# Patient Record
Sex: FEMALE | Race: BLACK OR AFRICAN AMERICAN | NOT HISPANIC OR LATINO | Employment: FULL TIME | URBAN - METROPOLITAN AREA
[De-identification: names, ages, dates, MRNs, and addresses within clinical notes are randomized per-mention and may not be internally consistent; named-entity substitution may affect disease eponyms.]

---

## 2023-06-27 ENCOUNTER — APPOINTMENT (OUTPATIENT)
Dept: PRIMARY CARE | Facility: CLINIC | Age: 38
End: 2023-06-27
Payer: COMMERCIAL

## 2023-07-12 PROBLEM — I10 HYPERTENSION: Status: ACTIVE | Noted: 2023-07-12

## 2023-07-12 PROBLEM — A59.9 TRICHOMONIASIS: Status: RESOLVED | Noted: 2023-07-12 | Resolved: 2023-07-12

## 2025-03-11 ENCOUNTER — OFFICE VISIT (OUTPATIENT)
Dept: URGENT CARE | Age: 40
End: 2025-03-11
Payer: COMMERCIAL

## 2025-03-11 DIAGNOSIS — Z76.89 ENCOUNTER TO ESTABLISH CARE: ICD-10-CM

## 2025-03-11 DIAGNOSIS — Z76.0 ISSUE OF REPEAT PRESCRIPTION FOR MEDICATION: ICD-10-CM

## 2025-03-11 DIAGNOSIS — I10 PRIMARY HYPERTENSION: Primary | ICD-10-CM

## 2025-03-11 RX ORDER — NIFEDIPINE 90 MG/1
90 TABLET, EXTENDED RELEASE ORAL DAILY
Qty: 30 TABLET | Refills: 2 | Status: SHIPPED | OUTPATIENT
Start: 2025-03-11

## 2025-03-11 RX ORDER — HYDROCHLOROTHIAZIDE 25 MG/1
25 TABLET ORAL DAILY
Qty: 30 TABLET | Refills: 2 | Status: SHIPPED | OUTPATIENT
Start: 2025-03-11 | End: 2025-06-09

## 2025-03-11 ASSESSMENT — ENCOUNTER SYMPTOMS
MUSCULOSKELETAL NEGATIVE: 1
ENDOCRINE NEGATIVE: 1
GASTROINTESTINAL NEGATIVE: 1
HEMATOLOGIC/LYMPHATIC NEGATIVE: 1
EYES NEGATIVE: 1
CONSTITUTIONAL NEGATIVE: 1
NEUROLOGICAL NEGATIVE: 1
PSYCHIATRIC NEGATIVE: 1
ALLERGIC/IMMUNOLOGIC NEGATIVE: 1
CARDIOVASCULAR NEGATIVE: 1
RESPIRATORY NEGATIVE: 1

## 2025-03-11 NOTE — PROGRESS NOTES
Subjective   Patient ID: Ashley Moore is a 39 y.o. female. They present today with a chief complaint of No chief complaint on file..    History of Present Illness  Patient is a 38 y/o female presenting for virtual evaluation for medication refills of Hydrochlorothiazide 25mg PO once daily and nifedipine ER 90mg PO once daily, as patient's previous PCP is now out of network. Patient denies symptoms of fever, chills, bodyaches, lethargy, weakness, chest pain/tightness/pressure, SOB, wheezing, N/V/D, ABD pain. Patient's final dose was taken yesterday.           Past Medical History  Allergies as of 03/11/2025    (No Known Allergies)       (Not in a hospital admission)       Past Medical History:   Diagnosis Date    Trichomoniasis 07/12/2023       History reviewed. No pertinent surgical history.         Review of Systems  Review of Systems   Constitutional: Negative.    HENT: Negative.     Eyes: Negative.    Respiratory: Negative.     Cardiovascular: Negative.    Gastrointestinal: Negative.    Endocrine: Negative.    Genitourinary: Negative.    Musculoskeletal: Negative.    Skin: Negative.    Allergic/Immunologic: Negative.    Neurological: Negative.    Hematological: Negative.    Psychiatric/Behavioral: Negative.                                    Objective    There were no vitals filed for this visit.  No LMP recorded.    Physical Exam  Constitutional:       Comments: Patient A/O x4, LOC 5, calm and cooperative. Patient speaking in complete sentences and following commands appropriately. Patient in no acute distress.    HENT:      Head: Normocephalic and atraumatic.      Nose: Nose normal.   Eyes:      Extraocular Movements: Extraocular movements intact.      Conjunctiva/sclera: Conjunctivae normal.      Pupils: Pupils are equal, round, and reactive to light.   Pulmonary:      Comments: No audible cough during examination. Patient speaking in complete sentences without SOB or difficulty. Patient in no acute  respiratory distress   Musculoskeletal:      Cervical back: Neck supple.   Skin:     General: Skin is dry.   Neurological:      General: No focal deficit present.      Mental Status: She is oriented to person, place, and time.   Psychiatric:         Mood and Affect: Mood normal.         Behavior: Behavior normal.      Comments: Remainder of examination unattainable d/t virtual nature of evaluation.          Procedures    Point of Care Test & Imaging Results from this visit  No results found for this visit on 03/11/25.   No results found.    Diagnostic study results (if any) were reviewed by MARIMAR Turner.    Assessment/Plan   Allergies, medications, history, and pertinent labs/EKGs/Imaging reviewed by MARIMAR Turner.     Medical Decision Making  -Patient presenting to virtual examination for medication refills of Hydrochlorothiazide 25mg, Nifedipine ER 90mg  -Patient's PCP is out of network with insurance; will send referral to establish primary care  -Provided 30 day supply with 2-refills to allow patient time for scheduling appt  -Discussed virtual evaluation limitations along with red-flag s/s to which would warrant immediate ED evaluation  -Stable upon discharge    I have communicated my name and active licensure information to the patient. The patient's identity and physical location were verified at the time of this visit. Either the patient or their legal representative were informed of the risks and benefits of, and alternatives to, treatment through a remote evaluation. The patient consented to proceed with the evaluation remotely. This remote visit was conducted using video and audio.     At time of discharge, patient was clinically well-appearing and appropriate for outpatient management. The patient/parent/guardian was educated regarding diagnosis, supportive care, OTC and Rx medications. The patient/parent/guardian was given the opportunity to ask questions prior to discharge. They  verbalized understanding of discussion of treatment plan, expected course of illness and/or injury, indications on when to return to , when to seek further evaluation in ED/call 911, and the need to follow up with PCP and/or specialist as referred. Patient/parent/guardian was provided with work/school documentation if requested. Patient stable upon discharge.     Orders and Diagnoses  Diagnoses and all orders for this visit:  Primary hypertension  -     hydroCHLOROthiazide (HYDRODiuril) 25 mg tablet; Take 1 tablet (25 mg) by mouth once daily.  -     NIFEdipine ER (Adalat CC) 90 mg 24 hr tablet; Take 1 tablet (90 mg) by mouth once daily. Do not crush, chew, or split.  -     Referral to Primary Care; Future  Issue of repeat prescription for medication  -     hydroCHLOROthiazide (HYDRODiuril) 25 mg tablet; Take 1 tablet (25 mg) by mouth once daily.  -     NIFEdipine ER (Adalat CC) 90 mg 24 hr tablet; Take 1 tablet (90 mg) by mouth once daily. Do not crush, chew, or split.  -     Referral to Primary Care; Future  Encounter to establish care  -     Referral to Primary Care; Future      Medical Admin Record      Patient disposition: Home    Electronically signed by MARIMAR Turner  5:58 PM    Urgent Care Virtual Video Visit    Patient Location: Home  Provider Location: [facility]    Video visit completed with realtime synchronous video/audio connection. Informed consent was obtained from the patient. Patient was made aware that my evaluation and diagnosis are limited due to the fact that we are not in the same room during the interview and that this is a virtual encounter that took place via videoconferencing. Patient verbalized understanding.     Patient disposition: Home    Electronically signed by MARIMAR Turner  5:58 PM

## 2025-07-13 ENCOUNTER — OFFICE VISIT (OUTPATIENT)
Dept: URGENT CARE | Age: 40
End: 2025-07-13
Payer: COMMERCIAL

## 2025-07-13 DIAGNOSIS — I15.9 SECONDARY HYPERTENSION: Primary | ICD-10-CM

## 2025-07-13 PROCEDURE — 99213 OFFICE O/P EST LOW 20 MIN: CPT | Performed by: NURSE PRACTITIONER

## 2025-07-13 RX ORDER — NIFEDIPINE 90 MG/1
90 TABLET, EXTENDED RELEASE ORAL DAILY
Qty: 30 TABLET | Refills: 0 | Status: SHIPPED | OUTPATIENT
Start: 2025-07-13 | End: 2026-07-13

## 2025-07-13 RX ORDER — HYDROCHLOROTHIAZIDE 25 MG/1
12.5 TABLET ORAL ONCE
Status: SHIPPED | OUTPATIENT
Start: 2025-07-13

## 2025-07-13 NOTE — PROGRESS NOTES
Urgent Care Virtual Video Visit    Patient Location: Virtual visit was conducted out of the [facility].  Provider Location: [facility]    I have communicated my name and active licensure. Video visit completed with realtime synchronous video/audio connection. Informed consent was obtained from the patient. Patient was made aware that my evaluation and diagnosis are limited due to the fact that we are not in the same room during the interview and that this is a virtual encounter that took place via videoconferencing. Patient verbalized understanding.     Patient had no physical complaints.  She needed a refill of her hydrochlorothiazide and nifedipine.  She was denying headache, neurologic deficit, chest pain, dyspnea, abdominal pain, nausea or vomiting.    Patient disposition: Home    Electronically signed by MARIMAR Tran  4:08 PM

## 2025-07-23 ENCOUNTER — PATIENT OUTREACH (OUTPATIENT)
Dept: CARE COORDINATION | Facility: CLINIC | Age: 40
End: 2025-07-23

## 2025-07-23 DIAGNOSIS — Z12.31 ENCOUNTER FOR SCREENING MAMMOGRAM FOR BREAST CANCER: ICD-10-CM
